# Patient Record
Sex: MALE
[De-identification: names, ages, dates, MRNs, and addresses within clinical notes are randomized per-mention and may not be internally consistent; named-entity substitution may affect disease eponyms.]

---

## 2023-01-06 ENCOUNTER — NURSE TRIAGE (OUTPATIENT)
Dept: OTHER | Facility: CLINIC | Age: 28
End: 2023-01-06

## 2023-01-07 NOTE — TELEPHONE ENCOUNTER
Location of patient: [place]    Subjective: Caller states All week pt has been dealing with fatigue but today it got significantly worse to the point to where he thought he was going to fall asleep driving. Recently got over covid and felt fine last week. Today he feels like he can barely make it to the restroom because his muscles don't want to work. Current Symptoms: nausea, fatigue    Onset: 5 days ago; sudden      Pain Severity: 3/10; soreness    Temperature: 100.0      Recommended disposition: Go to ED Now    Care advice provided, patient verbalizes understanding; denies any other questions or concerns; instructed to call back for any new or worsening symptoms. Patient/caller agrees to proceed to nearest Emergency Department    This triage is a result of a call to [address] [place] [address]. Please do not respond to the triage nurse through this encounter. Any subsequent communication should be directly with the patient.       Reason for Disposition   Patient sounds very sick or weak to the triager    Protocols used: Weakness (Generalized) and Fatigue-ADULT-

## 2023-01-09 ENCOUNTER — NURSE TRIAGE (OUTPATIENT)
Dept: OTHER | Facility: CLINIC | Age: 28
End: 2023-01-09

## 2023-01-09 NOTE — TELEPHONE ENCOUNTER
Location of patient: [place]    Subjective: Caller states Friday night called triage for weakness, could barely move, went to ER, dx silent UTI,taking antibiotic that was prescribed    Still cant move around without feeling short of breath. 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 Patient/caller agrees to proceed to nearest [facility]     This triage is a result of a call to [address] [place] [address]. Please do not respond to the triage nurse through this encounter. Any subsequent communication should be directly with the patient.     Reason for Disposition   [1] MILD difficulty breathing (e.g., minimal/no SOB at rest, SOB with walking, pulse <100) AND [2] NEW-onset or WORSE than normal    Protocols used: Breathing Difficulty-ADULT-AH